# Patient Record
Sex: FEMALE | Race: WHITE | Employment: PART TIME | URBAN - METROPOLITAN AREA
[De-identification: names, ages, dates, MRNs, and addresses within clinical notes are randomized per-mention and may not be internally consistent; named-entity substitution may affect disease eponyms.]

---

## 2017-01-04 ENCOUNTER — OFFICE VISIT (OUTPATIENT)
Dept: FAMILY MEDICINE CLINIC | Age: 42
End: 2017-01-04

## 2017-01-04 VITALS
TEMPERATURE: 98.5 F | WEIGHT: 120 LBS | DIASTOLIC BLOOD PRESSURE: 58 MMHG | HEART RATE: 86 BPM | BODY MASS INDEX: 22.66 KG/M2 | OXYGEN SATURATION: 97 % | HEIGHT: 61 IN | SYSTOLIC BLOOD PRESSURE: 109 MMHG | RESPIRATION RATE: 20 BRPM

## 2017-01-04 DIAGNOSIS — J01.10 ACUTE NON-RECURRENT FRONTAL SINUSITIS: Primary | ICD-10-CM

## 2017-01-04 RX ORDER — AMOXICILLIN 875 MG/1
875 TABLET, FILM COATED ORAL 2 TIMES DAILY
Qty: 20 TAB | Refills: 0 | Status: SHIPPED | OUTPATIENT
Start: 2017-01-04 | End: 2017-01-14

## 2017-01-04 NOTE — LETTER
NOTIFICATION RETURN TO WORK / SCHOOL 
 
1/4/2017 6:03 PM 
 
Ms. Jaswinder Velez [address] [facility] [address] To Whom It May Concern: 
 
Jaswinder Velez is currently under the care of [facility]. She will return to work/school on: 1/9/17 If there are questions or concerns please have the patient contact our office.  
 
 
 
Sincerely, 
 
 
ASHLEY Garrido

## 2017-01-04 NOTE — PROGRESS NOTES
Patient: Gypsy Weber MRN: [id number]  SSN: [id number]    YOB: 1975  Age: 37 y.o. Sex: female      Date of Service: 1/4/2017   Provider: ASHLEY Potts   Office Location:   [address] [place] [facility], [facility], [address]  Office Phone: [phone]  Office Fax: [phone]        REASON FOR VISIT:   Chief Complaint   Patient presents with    Cold Symptoms     pt presents for cough and congestion non productive cough pt did take dayquil which help but still have congestion\"        VITALS:   Visit Vitals    /58    Pulse 86    Temp 98.5 °F (36.9 °C) (Oral)    Resp 20    Ht 5' 1\" (1.549 m)    Wt 120 lb (54.4 kg)    SpO2 97%    BMI 22.67 kg/m2       MEDICATIONS:   Current Outpatient Prescriptions   Medication Sig Dispense Refill    ibuprofen (MOTRIN) 200 mg tablet Take  by mouth.  multivitamin (ONE A DAY) tablet Take 1 Tab by mouth daily.  ALPRAZolam (XANAX) 0.25 mg tablet Take 1 Tab by mouth daily as needed for Anxiety. 30 Tab 0    albuterol (PROVENTIL HFA, VENTOLIN HFA, PROAIR HFA) 90 mcg/actuation inhaler Take 2 puffs by inhalation every four (4) hours as needed for Wheezing or Shortness of Breath. 1 Inhaler 0    fluticasone (FLONASE) 50 mcg/actuation nasal spray 2 Sprays by Both Nostrils route daily. 1 Bottle 0        ALLERGIES:   Allergies   Allergen Reactions    Benadryl Anti-Itch (Camphor) [Camphor] Shortness of Breath, Rash, Palpitations and Other (comments)     Tachycardia - (its the iv benadryl)    Nubain [Nalbuphine] Shortness of Breath, Rash, Palpitations and Other (comments)     tachycardia        ACTIVE MEDICAL PROBLEMS:  There is no problem list on file for this patient. MEDICAL/SURGICAL HISTORY:  History reviewed. No pertinent past medical history.    Past Surgical History   Procedure Laterality Date    Hx gyn          FAMILY HISTORY:  Family History   Problem Relation Age of Onset    Hypertension Mother  Diabetes Father     Heart Disease Father     Cancer Paternal Grandmother         SOCIAL HISTORY:  Social History   Substance Use Topics    Smoking status: Never Smoker    Smokeless tobacco: Never Used    Alcohol use No            HISTORY OF PRESENT ILLNESS:   Sukhjinder Rizvi is a 37 y.o. female who presents to the office complaining of 1-2 week history of URI symptoms  Symptoms started a little over a week ago with a \"scratchy\" throat. Patient reports she started taking Dayquil, and did feel better. However, a few days later, she developed head congestion, chills, and body aches. She stayed home from work 1 day and rested, and felt a bit better the next day. Then symptoms progressed to nasal congestion and dry cough, which have persisted over the past week. Admits intermittent wheezing, Denies fevers, chills, chest pain, shortness of breath, n/v/d. REVIEW OF SYSTEMS:  Review of Systems   Constitutional: Positive for malaise/fatigue. Negative for chills and fever. HENT: Positive for congestion and sore throat. Negative for ear pain. Eyes: Negative for discharge and redness. Respiratory: Positive for cough. Negative for hemoptysis, sputum production, shortness of breath and wheezing. Cardiovascular: Negative for chest pain and palpitations. Gastrointestinal: Negative for diarrhea, nausea and vomiting. Musculoskeletal: Negative for myalgias. Skin: Negative for rash. Neurological: Positive for headaches. PHYSICAL EXAMINATION:  Physical Exam   Constitutional: She is well-developed, well-nourished, and in no distress. HENT:   Head: Normocephalic and atraumatic. Right Ear: Tympanic membrane, external ear and ear canal normal.   Left Ear: Tympanic membrane, external ear and ear canal normal.   Nose: Mucosal edema present. Right sinus exhibits frontal sinus tenderness. Right sinus exhibits no maxillary sinus tenderness. Left sinus exhibits frontal sinus tenderness.  Left sinus exhibits no maxillary sinus tenderness. Mouth/Throat: Uvula is midline and mucous membranes are normal. Posterior oropharyngeal erythema present. No oropharyngeal exudate, posterior oropharyngeal edema or tonsillar abscesses. Post-nasal drip noted   Eyes: Conjunctivae are normal.   Neck: Neck supple. Cardiovascular: Normal rate, regular rhythm and normal heart sounds. Exam reveals no gallop and no friction rub. No murmur heard. Pulmonary/Chest: Effort normal and breath sounds normal. She has no wheezes. She has no rales. Lymphadenopathy:     She has cervical adenopathy. Skin: Skin is warm and dry. RESULTS:  No results found for this visit on 01/04/17. ASSESSMENT/PLAN:  There are no diagnoses linked to this encounter.             PATIENT CARE TEAM:   Patient Care Team:  Marichuy Gama MD as PCP - [facility])       Sariah Johns   1/5/2017  6:50 AM

## 2017-01-17 ENCOUNTER — OFFICE VISIT (OUTPATIENT)
Dept: FAMILY MEDICINE CLINIC | Facility: CLINIC | Age: 42
End: 2017-01-17

## 2017-01-17 VITALS
RESPIRATION RATE: 16 BRPM | HEART RATE: 84 BPM | WEIGHT: 123.2 LBS | SYSTOLIC BLOOD PRESSURE: 105 MMHG | TEMPERATURE: 99.6 F | OXYGEN SATURATION: 98 % | HEIGHT: 60 IN | DIASTOLIC BLOOD PRESSURE: 86 MMHG | BODY MASS INDEX: 24.19 KG/M2

## 2017-01-17 DIAGNOSIS — J06.9 UPPER RESPIRATORY TRACT INFECTION, UNSPECIFIED TYPE: Primary | ICD-10-CM

## 2017-01-17 DIAGNOSIS — R05.9 COUGH: ICD-10-CM

## 2017-01-17 RX ORDER — PSEUDOEPHEDRINE HCL 120 MG/1
120 TABLET, FILM COATED, EXTENDED RELEASE ORAL 2 TIMES DAILY
Qty: 14 TAB | Refills: 0 | Status: SHIPPED | OUTPATIENT
Start: 2017-01-17 | End: 2017-01-24

## 2017-01-17 NOTE — PATIENT INSTRUCTIONS
Learning About COPD and Upper Respiratory Infections  What are upper respiratory infections? An upper respiratory infection, also called a URI, is an infection of the nose, sinuses, or throat. Viruses or bacteria can cause URIs. Colds, the flu, and sinusitis are examples of URIs. These infections are spread by coughs, sneezes, and close contact. How do these infections affect COPD? A URI can worsen COPD symptoms, such as having too much mucus in your lungs, coughing, or being short of breath. What can you do to manage most infections at home? · Do not smoke. Avoid secondhand smoke. · Take an over-the-counter pain medicine, such as acetaminophen (Tylenol), ibuprofen (Advil, Motrin), or naproxen (Aleve). Read and follow all instructions on the label. · Be careful when taking over-the-counter cold or flu medicines and Tylenol at the same time. Many of these medicines have acetaminophen, which is Tylenol. Read the labels to make sure that you are not taking more than the recommended dose. Too much acetaminophen (Tylenol) can be harmful. · If your doctor prescribed antibiotics, take them as directed. Do not stop taking them just because you feel better. You need to take the full course of antibiotics. · Ask your doctor about cough medicines and decongestants. Some doctors recommend these medicines, while others feel that they do not help. · Learn breathing techniques for COPD, such as breathing through pursed lips. These techniques can help you breathe easier. What can you do to prevent these infections? Stay healthy  · Do not smoke. This is the most important step you can take to prevent more damage to your lungs. If you need help quitting, talk to your doctor about stop-smoking programs and medicines. These can increase your chances of quitting for good. · Avoid secondhand smoke, air pollution, and high altitudes. Also avoid cold, dry air and hot, humid air.  Stay at home with your windows closed when air pollution is bad. · Get a flu shot every year. · Get a pneumococcal vaccine shot. If you have had one before, ask your doctor whether you need another dose. · If you must be around people with colds or the flu, wash your hands often. Exercise and eat well  · If your doctor recommends it, get more exercise. Walking is a good choice. Bit by bit, increase the amount you walk every day. Try for at least 30 minutes on most days of the week. · Eat regular, well-balanced meals. Eating right keeps your energy levels up and helps your body fight infection. · Get plenty of rest and sleep. Follow-up care is a key part of your treatment and safety. Be sure to make and go to all appointments, and call your doctor if you are having problems. It's also a good idea to know your test results and keep a list of the medicines you take. Where can you learn more? Go to http://adalid-anthony.info/. Enter R139 in the search box to learn more about \"Learning About COPD and Upper Respiratory Infections. \"  Current as of: May 23, 2016  Content Version: 11.1  © 8649-0776 [employer]. Care instructions adapted under license by [employer] (which disclaims liability or warranty for this information). If you have questions about a medical condition or this instruction, always ask your healthcare professional. [address] any warranty or liability for your use of this information.

## 2017-01-17 NOTE — LETTER
NOTIFICATION RETURN TO WORK / SCHOOL 
 
1/17/2017 4:46 PM 
 
Ms. Fifi West [address] [place] [address] To Whom It May Concern: 
 
Fifi West is currently under the care of [address]. She will return to work/school on: 1/23/17 If there are questions or concerns please have the patient contact our office.  
 
 
 
Sincerely, 
 
 
Edvin Sanches NP

## 2017-01-17 NOTE — MR AVS SNAPSHOT
Visit Information Date & Time Provider Department Dept. Phone Encounter #  
 1/17/2017  4:30 PM Marquis Shelton NP [facility] [phone] [id number] Upcoming Health Maintenance Date Due DTaP/Tdap/Td series (1 - Tdap) 7/23/1997 PAP AKA CERVICAL CYTOLOGY 7/23/1997 INFLUENZA AGE 9 TO ADULT 8/1/2016 Allergies as of 1/17/2017  Review Complete On: 1/17/2017 By: Fior Cuevas Severity Noted Reaction Type Reactions Benadryl Anti-itch (Camphor) [Camphor] High 12/05/2012   Side Effect Shortness of Breath, Rash, Palpitations, Other (comments) Tachycardia - (its the iv benadryl) Nubain [Nalbuphine] High 12/05/2012   Side Effect Shortness of Breath, Rash, Palpitations, Other (comments)  
 tachycardia Current Immunizations  Never Reviewed No immunizations on file. Not reviewed this visit You Were Diagnosed With   
  
 Codes Comments Upper respiratory tract infection, unspecified type    -  Primary ICD-10-CM: J06.9 ICD-9-CM: 465.9 Cough     ICD-10-CM: R05 ICD-9-CM: 081. 2 Vitals BP Pulse Temp Resp Height(growth percentile) Weight(growth percentile) 105/86 84 99.6 °F (37.6 °C) 16 5' (1.524 m) 123 lb 3.2 oz (55.9 kg) LMP SpO2 BMI OB Status Smoking Status 12/28/2016 98% 24.06 kg/m2 Having regular periods Never Smoker Vitals History BMI and BSA Data Body Mass Index Body Surface Area 24.06 kg/m 2 1.54 m 2 Preferred Pharmacy [facility], [address] [place] [address] [phone] Your Updated Medication List  
  
   
This list is accurate as of: 1/17/17  4:46 PM.  Always use your most recent med list.  
  
  
  
  
 albuterol 90 mcg/actuation inhaler Commonly known as:  PROVENTIL HFA, VENTOLIN HFA, PROAIR HFA Take 2 puffs by inhalation every four (4) hours as needed for Wheezing or Shortness of Breath. ALPRAZolam 0.25 mg tablet Commonly known as:  Sasser Amend Take 1 Tab by mouth daily as needed for Anxiety. fluticasone 50 mcg/actuation nasal spray Commonly known as:  Guadalupe Serene 2 Sprays by Both Nostrils route daily. ibuprofen 200 mg tablet Commonly known as:  MOTRIN Take  by mouth every six (6) hours as needed. multivitamin tablet Commonly known as:  ONE A DAY Take 1 Tab by mouth daily. pseudoephedrine  mg CR tablet Commonly known as:  SUDAFED Take 1 Tab by mouth two (2) times a day for 7 days. Prescriptions Sent to Pharmacy Refills  
 pseudoephedrine CR (SUDAFED) 120 mg CR tablet 0 Sig: Take 1 Tab by mouth two (2) times a day for 7 days. Class: Normal  
 Pharmacy: [address] [place] [address] #: [phone] Route: Oral  
  
Patient Instructions Learning About COPD and Upper Respiratory Infections What are upper respiratory infections? An upper respiratory infection, also called a URI, is an infection of the nose, sinuses, or throat. Viruses or bacteria can cause URIs. Colds, the flu, and sinusitis are examples of URIs. These infections are spread by coughs, sneezes, and close contact. How do these infections affect COPD? A URI can worsen COPD symptoms, such as having too much mucus in your lungs, coughing, or being short of breath. What can you do to manage most infections at home? · Do not smoke. Avoid secondhand smoke. · Take an over-the-counter pain medicine, such as acetaminophen (Tylenol), ibuprofen (Advil, Motrin), or naproxen (Aleve). Read and follow all instructions on the label. · Be careful when taking over-the-counter cold or flu medicines and Tylenol at the same time. Many of these medicines have acetaminophen, which is Tylenol. Read the labels to make sure that you are not taking more than the recommended dose. Too much acetaminophen (Tylenol) can be harmful. · If your doctor prescribed antibiotics, take them as directed. Do not stop taking them just because you feel better. You need to take the full course of antibiotics. · Ask your doctor about cough medicines and decongestants. Some doctors recommend these medicines, while others feel that they do not help. · Learn breathing techniques for COPD, such as breathing through pursed lips. These techniques can help you breathe easier. What can you do to prevent these infections? Stay healthy · Do not smoke. This is the most important step you can take to prevent more damage to your lungs. If you need help quitting, talk to your doctor about stop-smoking programs and medicines. These can increase your chances of quitting for good. · Avoid secondhand smoke, air pollution, and high altitudes. Also avoid cold, dry air and hot, humid air. Stay at home with your windows closed when air pollution is bad. · Get a flu shot every year. · Get a pneumococcal vaccine shot. If you have had one before, ask your doctor whether you need another dose. · If you must be around people with colds or the flu, wash your hands often. Exercise and eat well · If your doctor recommends it, get more exercise. Walking is a good choice. Bit by bit, increase the amount you walk every day. Try for at least 30 minutes on most days of the week. · Eat regular, well-balanced meals. Eating right keeps your energy levels up and helps your body fight infection. · Get plenty of rest and sleep. Follow-up care is a key part of your treatment and safety. Be sure to make and go to all appointments, and call your doctor if you are having problems. It's also a good idea to know your test results and keep a list of the medicines you take. Where can you learn more? Go to http://adalid-anthony.info/. Enter U982 in the search box to learn more about \"Learning About COPD and Upper Respiratory Infections. \" Current as of: May 23, 2016 Content Version: 11.1 © 2074-5235 [employer]. Care instructions adapted under license by [employer] (which disclaims liability or warranty for this information). If you have questions about a medical condition or this instruction, always ask your healthcare professional. [address] any warranty or liability for your use of this information. Introducing [facility]! Dear [facility]: Thank you for requesting a Redeemr account. Our records indicate that you already have an active Redeemr account. You can access your account anytime at [email or web address]. [email or web address]Â® Did you know that you can access your hospital and ER discharge instructions at any time in Redeemr? You can also review all of your test results from your hospital stay or ER visit. Additional Information If you have questions, please visit the Frequently Asked Questions section of the Redeemr website at [email or web address]/. Remember, Redeemr is NOT to be used for urgent needs. For medical emergencies, dial 911. Now available from your iPhone and Android! Please provide this summary of care documentation to your next provider. Your primary care clinician is listed as Jagdish Sanches. If you have any questions after today's visit, please call [phone].

## 2017-01-17 NOTE — PROGRESS NOTES
HISTORY OF PRESENT ILLNESS  Brittney Dugan is a 37 y.o. female. HPI Comments: Acute care visit with c/o cough and congestion for more than 1 week. She was seen for this a couple weeks ago, she has tried OTC mucinex and dayquil with no relief. Reports ill contacts. Reports complete loss of voice for 2 days. Cough   The history is provided by the patient. This is a new problem. The current episode started more than 1 week ago. The problem occurs constantly. The problem has not changed since onset. Treatments tried: mucinex. The treatment provided no relief. Nasal Congestion    The history is provided by the patient. This is a new problem. The current episode started more than 1 week ago. The problem has not changed since onset. Patient reports a subjective fever - was not measured. Associated symptoms include congestion and cough. Treatments tried: mucinex, dayquil. The treatment provided no relief. Review of Systems   HENT: Positive for congestion. Respiratory: Positive for cough. History reviewed. No pertinent past medical history. Past Surgical History   Procedure Laterality Date    Hx gyn      Hx  section  1999     Current Outpatient Prescriptions on File Prior to Visit   Medication Sig Dispense Refill    ibuprofen (MOTRIN) 200 mg tablet Take  by mouth every six (6) hours as needed.  multivitamin (ONE A DAY) tablet Take 1 Tab by mouth daily.  ALPRAZolam (XANAX) 0.25 mg tablet Take 1 Tab by mouth daily as needed for Anxiety. 30 Tab 0    fluticasone (FLONASE) 50 mcg/actuation nasal spray 2 Sprays by Both Nostrils route daily. 1 Bottle 0    albuterol (PROVENTIL HFA, VENTOLIN HFA, PROAIR HFA) 90 mcg/actuation inhaler Take 2 puffs by inhalation every four (4) hours as needed for Wheezing or Shortness of Breath. 1 Inhaler 0     No current facility-administered medications on file prior to visit. Allergies and Intolerances:    Allergies   Allergen Reactions    Benadryl Anti-Itch (Camphor) [Camphor] Shortness of Breath, Rash, Palpitations and Other (comments)     Tachycardia - (its the iv benadryl)    Nubain [Nalbuphine] Shortness of Breath, Rash, Palpitations and Other (comments)     tachycardia       Family History:   Family History   Problem Relation Age of Onset    Hypertension Mother     Diabetes Father     Heart Disease Father     Cancer Paternal Grandmother        Social History:   She  reports that she has never smoked. She has never used smokeless tobacco. She  reports that she does not drink alcohol. Vitals:   Visit Vitals    /86    Pulse 84    Temp 99.6 °F (37.6 °C)    Resp 16    Ht 5' (1.524 m)    Wt 123 lb 3.2 oz (55.9 kg)    LMP 12/28/2016    SpO2 98%    BMI 24.06 kg/m2     Body surface area is 1.54 meters squared. Physical Exam   Constitutional: She is oriented to person, place, and time. She appears well-developed and well-nourished. HENT:   Head: Atraumatic. Right Ear: External ear normal.   Left Ear: External ear normal.   Cardiovascular: Normal rate. Pulmonary/Chest: Effort normal and breath sounds normal. She has no wheezes. Neurological: She is alert and oriented to person, place, and time. Skin: Skin is warm. Psychiatric: She has a normal mood and affect. Her behavior is normal.   Nursing note and vitals reviewed. ASSESSMENT and PLAN    ICD-10-CM ICD-9-CM    1. Upper respiratory tract infection, unspecified type J06.9 465.9    2. Cough R05 786.2 pseudoephedrine CR (SUDAFED) 120 mg CR tablet     will try her on Pseudoephedrine and conservative therapy at this time. - Drink plenty of fluids, get plenty rest.  - May return to work 1/23/17, letter provided. - Continue saline nasal spray. Follow-up Disposition:  Return if symptoms worsen or fail to improve. reviewed medications and side effects in detail    - Alarm signals discussed. ER precautions  - Plan of care reviewed with patient.   Understanding verbalized and they are in agreement with plan of care.

## 2017-02-01 ENCOUNTER — OFFICE VISIT (OUTPATIENT)
Dept: FAMILY MEDICINE CLINIC | Facility: CLINIC | Age: 42
End: 2017-02-01

## 2017-02-01 VITALS
HEIGHT: 60 IN | BODY MASS INDEX: 24.66 KG/M2 | TEMPERATURE: 97.5 F | HEART RATE: 87 BPM | RESPIRATION RATE: 16 BRPM | OXYGEN SATURATION: 99 % | WEIGHT: 125.6 LBS | DIASTOLIC BLOOD PRESSURE: 60 MMHG | SYSTOLIC BLOOD PRESSURE: 97 MMHG

## 2017-02-01 DIAGNOSIS — Z02.1 PHYSICAL EXAM, PRE-EMPLOYMENT: Primary | ICD-10-CM

## 2017-02-01 DIAGNOSIS — Z11.1 ENCOUNTER FOR SCREENING FOR RESPIRATORY TUBERCULOSIS: ICD-10-CM

## 2017-02-01 LAB
MM INDURATION POC: NORMAL MM (ref 0–5)
PPD POC: NORMAL NEGATIVE

## 2017-02-01 NOTE — MR AVS SNAPSHOT
Visit Information Date & Time Provider Department Dept. Phone Encounter #  
 2/1/2017 10:30 AM Lidia Garza NP [place] [phone] [id number] Follow-up Instructions Return in about 1 year (around 2/1/2018), or if symptoms worsen or fail to improve. Upcoming Health Maintenance Date Due DTaP/Tdap/Td series (1 - Tdap) 7/23/1997 PAP AKA CERVICAL CYTOLOGY 7/23/1997 INFLUENZA AGE 9 TO ADULT 8/1/2016 Allergies as of 2/1/2017  Review Complete On: 2/1/2017 By: Ernesto Smith Severity Noted Reaction Type Reactions Benadryl Anti-itch (Camphor) [Camphor] High 12/05/2012   Side Effect Shortness of Breath, Rash, Palpitations, Other (comments) Tachycardia - (its the iv benadryl) Nubain [Nalbuphine] High 12/05/2012   Side Effect Shortness of Breath, Rash, Palpitations, Other (comments)  
 tachycardia Current Immunizations  Never Reviewed Name Date  
 TB Skin Test (PPD) Intradermal  Incomplete Not reviewed this visit You Were Diagnosed With   
  
 Codes Comments Encounter for screening for respiratory tuberculosis    -  Primary ICD-10-CM: Z11.1 ICD-9-CM: V74.1 Physical exam, pre-employment     ICD-10-CM: Z02.1 ICD-9-CM: V70.5 Vitals BP Pulse Temp Resp Height(growth percentile) Weight(growth percentile) 97/60 87 97.5 °F (36.4 °C) 16 5' (1.524 m) 125 lb 9.6 oz (57 kg) LMP SpO2 BMI OB Status Smoking Status 01/23/2017 99% 24.53 kg/m2 Having regular periods Never Smoker Vitals History BMI and BSA Data Body Mass Index Body Surface Area 24.53 kg/m 2 1.55 m 2 Preferred Pharmacy Pharmacy Name Phone [address] , [address] [phone] Your Updated Medication List  
  
   
This list is accurate as of: 2/1/17 11:11 AM.  Always use your most recent med list.  
  
  
  
  
 albuterol 90 mcg/actuation inhaler Commonly known as:  PROVENTIL HFA, VENTOLIN HFA, PROAIR HFA Take 2 puffs by inhalation every four (4) hours as needed for Wheezing or Shortness of Breath. ALPRAZolam 0.25 mg tablet Commonly known as:  Luciano Nellie Take 1 Tab by mouth daily as needed for Anxiety. fluticasone 50 mcg/actuation nasal spray Commonly known as:  Marlene Fetch 2 Sprays by Both Nostrils route daily. ibuprofen 200 mg tablet Commonly known as:  MOTRIN Take  by mouth every six (6) hours as needed. multivitamin tablet Commonly known as:  ONE A DAY Take 1 Tab by mouth daily. We Performed the Following AMB POC TUBERCULOSIS, INTRADERMAL (SKIN TEST) [44833 CPT(R)] Follow-up Instructions Return in about 1 year (around 2/1/2018), or if symptoms worsen or fail to improve. Patient Instructions Tuberculin Skin Test: Care Instructions Your Care Instructions Tuberculosis (TB) is a bacterial infection that can damage the lungs or other parts of the body. The TB skin test can tell if you have TB bacteria in your body. Many people are exposed to TB and test positive for TB bacteria in their bodies, but they don't get the disease. TB bacteria can stay in your body without making you sick. This is because your immune system can keep TB in check. Your doctor may want you to have a TB skin test if you have been in close contact with someone who has TB. Or you may need the test if you have symptoms that might be caused by TB, such as a cough that does not go away, a fever, or weight loss. You also may get the test if you are a health care worker. During the skin test, part of a TB bacterium is injected under your skin. The test will feel like a skin prick. If you have TB bacteria in your body, a firm red bump will form at the shot site within 2 days.  If the test shows that you are infected with TB (positive), your doctor probably will order more tests. A TB-positive skin test can't tell when you became infected with TB. And it can't tell whether the infection can be passed to others. Follow-up care is a key part of your treatment and safety. Be sure to make and go to all appointments, and call your doctor if you are having problems. It's also a good idea to know your test results and keep a list of the medicines you take. How can you care for yourself at home? · Do not scratch the test site. Scratching it may cause redness or swelling. This could affect the test results. · To ease itching, put a cold washcloth on the site. Then pat the site dry. · Do not cover the test site with a bandage or other dressing. · Go back to your doctor's office or hospital to have the test read on the follow-up date. This must be done between 48 and 72 hours after you get the shot. When should you call for help? Watch closely for changes in your health, and be sure to contact your doctor if: 
· You did not have your TB skin test checked by your doctor. · You have a fever or swelling in your arm. · You do not get better as expected. Where can you learn more? Go to http://adalid-anthony.info/. Enter ([phone] [phone] in the search box to learn more about \"Tuberculin Skin Test: Care Instructions. \" Current as of: May 24, 2016 Content Version: 11.1 © 1351-3683 [employer]. Care instructions adapted under license by [employer] (which disclaims liability or warranty for this information). If you have questions about a medical condition or this instruction, always ask your healthcare professional. [address] any warranty or liability for your use of this information. Introducing \[facility]\"" & HEALTH SERVICES! Dear Camille Lyon: Thank you for requesting a [employer] account. Our records indicate that you already have an active [employer] account.   You can access your account anytime at [email or web address] Did you know that you can access your hospital and ER discharge instructions at any time in [employer]? You can also review all of your test results from your hospital stay or ER visit. Additional Information If you have questions, please visit the Frequently Asked Questions section of the [employer] website at [email or web address] Remember, [employer] is NOT to be used for urgent needs. For medical emergencies, dial 911. Now available from your iPhone and Android! Please provide this summary of care documentation to your next provider. Your primary care clinician is listed as Fredrick Mario. If you have any questions after today's visit, please call [phone].

## 2017-02-01 NOTE — PATIENT INSTRUCTIONS
Tuberculin Skin Test: Care Instructions  Your Care Instructions    Tuberculosis (TB) is a bacterial infection that can damage the lungs or other parts of the body. The TB skin test can tell if you have TB bacteria in your body. Many people are exposed to TB and test positive for TB bacteria in their bodies, but they don't get the disease. TB bacteria can stay in your body without making you sick. This is because your immune system can keep TB in check. Your doctor may want you to have a TB skin test if you have been in close contact with someone who has TB. Or you may need the test if you have symptoms that might be caused by TB, such as a cough that does not go away, a fever, or weight loss. You also may get the test if you are a health care worker. During the skin test, part of a TB bacterium is injected under your skin. The test will feel like a skin prick. If you have TB bacteria in your body, a firm red bump will form at the shot site within 2 days. If the test shows that you are infected with TB (positive), your doctor probably will order more tests. A TB-positive skin test can't tell when you became infected with TB. And it can't tell whether the infection can be passed to others. Follow-up care is a key part of your treatment and safety. Be sure to make and go to all appointments, and call your doctor if you are having problems. It's also a good idea to know your test results and keep a list of the medicines you take. How can you care for yourself at home? · Do not scratch the test site. Scratching it may cause redness or swelling. This could affect the test results. · To ease itching, put a cold washcloth on the site. Then pat the site dry. · Do not cover the test site with a bandage or other dressing. · Go back to your doctor's office or hospital to have the test read on the follow-up date. This must be done between 48 and 72 hours after you get the shot. When should you call for help?   Watch closely for changes in your health, and be sure to contact your doctor if:  · You did not have your TB skin test checked by your doctor. · You have a fever or swelling in your arm. · You do not get better as expected. Where can you learn more? Go to http://adalid-anthony.info/. Enter ([phone] [phone] in the search box to learn more about \"Tuberculin Skin Test: Care Instructions. \"  Current as of: May 24, 2016  Content Version: 11.1  © 0681-2468 [employer]. Care instructions adapted under license by [employer] (which disclaims liability or warranty for this information). If you have questions about a medical condition or this instruction, always ask your healthcare professional. [address] any warranty or liability for your use of this information.

## 2017-02-01 NOTE — PROGRESS NOTES
HISTORY OF PRESENT ILLNESS  Kelly Angulo is a 37 y.o. female. HPI Comments: Presents today for an employment physical. Continues to c/o cough which is mostly unproductive otherwise she is in good health. Employment Physical   The history is provided by the patient. This is a new problem. The current episode started more than 1 week ago. Review of Systems   HENT: Negative. Respiratory: Positive for cough. Cardiovascular: Negative. Genitourinary: Negative. Musculoskeletal: Negative. Neurological: Negative. No past medical history on file. Past Surgical History   Procedure Laterality Date    Hx gyn      Hx  section  1999     Current Outpatient Prescriptions on File Prior to Visit   Medication Sig Dispense Refill    ibuprofen (MOTRIN) 200 mg tablet Take  by mouth every six (6) hours as needed.  multivitamin (ONE A DAY) tablet Take 1 Tab by mouth daily.  ALPRAZolam (XANAX) 0.25 mg tablet Take 1 Tab by mouth daily as needed for Anxiety. 30 Tab 0    albuterol (PROVENTIL HFA, VENTOLIN HFA, PROAIR HFA) 90 mcg/actuation inhaler Take 2 puffs by inhalation every four (4) hours as needed for Wheezing or Shortness of Breath. 1 Inhaler 0    fluticasone (FLONASE) 50 mcg/actuation nasal spray 2 Sprays by Both Nostrils route daily. 1 Bottle 0     No current facility-administered medications on file prior to visit. Allergies and Intolerances: Allergies   Allergen Reactions    Benadryl Anti-Itch (Camphor) [Camphor] Shortness of Breath, Rash, Palpitations and Other (comments)     Tachycardia - (its the iv benadryl)    Nubain [Nalbuphine] Shortness of Breath, Rash, Palpitations and Other (comments)     tachycardia       Family History:   Family History   Problem Relation Age of Onset    Hypertension Mother     Diabetes Father     Heart Disease Father     Cancer Paternal Grandmother        Social History:   She  reports that she has never smoked.  She has never used smokeless tobacco. She  reports that she does not drink alcohol. Vitals:   Visit Vitals    BP 97/60    Pulse 87    Temp 97.5 °F (36.4 °C)    Resp 16    Ht 5' (1.524 m)    Wt 125 lb 9.6 oz (57 kg)    LMP 01/23/2017    SpO2 99%    BMI 24.53 kg/m2     Body surface area is 1.55 meters squared. Physical Exam   Constitutional: She is oriented to person, place, and time. She appears well-developed and well-nourished. HENT:   Head: Atraumatic. Eyes: Conjunctivae and EOM are normal. Pupils are equal, round, and reactive to light. Neck: Normal range of motion. No thyromegaly present. Cardiovascular: Normal rate. Pulmonary/Chest: Effort normal and breath sounds normal.   Abdominal: Soft. Bowel sounds are normal. There is tenderness. Musculoskeletal: Normal range of motion. Neurological: She is alert and oriented to person, place, and time. Skin: Skin is warm. Psychiatric: She has a normal mood and affect. Her behavior is normal.   Nursing note and vitals reviewed. ASSESSMENT and PLAN    ICD-10-CM ICD-9-CM    1. Physical exam, pre-employment Z02.1 V70.5    2. Encounter for screening for respiratory tuberculosis Z11.1 V74.1 AMB POC TUBERCULOSIS, INTRADERMAL (SKIN TEST)     Follow-up Disposition:  Return in about 1 year (around 2/1/2018), or if symptoms worsen or fail to improve. - Alarm signals discussed. ER precautions  - Plan of care reviewed with patient. Understanding verbalized and they are in agreement with plan of care.